# Patient Record
Sex: MALE | Race: OTHER | NOT HISPANIC OR LATINO | Employment: OTHER | URBAN - METROPOLITAN AREA
[De-identification: names, ages, dates, MRNs, and addresses within clinical notes are randomized per-mention and may not be internally consistent; named-entity substitution may affect disease eponyms.]

---

## 2024-06-19 ENCOUNTER — OFFICE VISIT (OUTPATIENT)
Dept: FAMILY MEDICINE CLINIC | Facility: CLINIC | Age: 39
End: 2024-06-19
Payer: MEDICAID

## 2024-06-19 VITALS
DIASTOLIC BLOOD PRESSURE: 74 MMHG | SYSTOLIC BLOOD PRESSURE: 116 MMHG | RESPIRATION RATE: 21 BRPM | WEIGHT: 219.2 LBS | OXYGEN SATURATION: 98 % | BODY MASS INDEX: 28.13 KG/M2 | TEMPERATURE: 98 F | HEART RATE: 75 BPM

## 2024-06-19 DIAGNOSIS — F90.9 HYPERACTIVE BEHAVIOR: ICD-10-CM
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
## 2024-06-19 DIAGNOSIS — R41.840 DIFFICULTY CONCENTRATING: ICD-10-CM

## 2024-06-19 PROCEDURE — 99213 OFFICE O/P EST LOW 20 MIN: CPT | Performed by: NURSE PRACTITIONER

## 2024-06-19 PROCEDURE — 1160F RVW MEDS BY RX/DR IN RCRD: CPT | Performed by: NURSE PRACTITIONER

## 2024-06-19 PROCEDURE — 1159F MED LIST DOCD IN RCRD: CPT | Performed by: NURSE PRACTITIONER

## 2024-06-19 NOTE — PROGRESS NOTES
Office Note     Name: Ann Mosley    : 1985     MRN: [id number]     Chief Complaint  Insect Bite and Groin Pain    Subjective     History of Present Illness:  Ann Mosley is a 38 y.o. male who presents today for insect bites. He reports being outside for a family gathering in a backyard. The bites are very itchy and he is using a topical spray for itching. He feels some tenderness in his right groin near just above the insect bites.  Positive seasonal allergies. He does not take any medications.     Additionally, the patient reports having a long history of ADHD symptoms. He has difficulty concentrating. He reports having hyperactivity symptoms. His states his wife has noticed his symptoms and requests he have an evaluation for ADHD.     Review of Systems:   Review of Systems   Constitutional:  Negative for chills, diaphoresis and fever.   Gastrointestinal:  Negative for diarrhea, nausea and vomiting.   Musculoskeletal:  Negative for myalgias.   Neurological:  Negative for dizziness, light-headedness and headache.       Past Medical History: History reviewed. No pertinent past medical history.    Past Surgical History: History reviewed. No pertinent surgical history.    Immunizations:   Immunization History   Administered Date(s) Administered    COVID-19 (PFIZER) Purple Cap Monovalent 2021, 2021    Tdap 06/10/2017        Medications:     Current Outpatient Medications:     cyclobenzaprine (FLEXERIL) 10 MG tablet, 1/2-1 PO Qhs prn muscle spasm (Patient not taking: Reported on 2024), Disp: 30 tablet, Rfl: 0    diphenhydrAMINE (BENADRYL) 2 % cream, Apply 1 Application topically to the appropriate area as directed 3 (Three) Times a Day As Needed for Itching., Disp: 15 g, Rfl: 0    naproxen (NAPROSYN) 500 MG tablet, Take 1 tablet by mouth 2 (Two) Times a Day As Needed for Mild Pain for up to 14 doses. (Patient not taking: Reported on 2024), Disp: 14 tablet, Rfl:  "0    Allergies:   No Known Allergies    Family History: History reviewed. No pertinent family history.    Social History:   Social History     Socioeconomic History    Marital status:    Tobacco Use    Smoking status: Former     Current packs/day: 0.00     Average packs/day: 1 pack/day for 20.0 years (20.0 ttl pk-yrs)     Types: Cigarettes     Start date: 2003     Quit date: 2023     Years since quittin.7    Smokeless tobacco: Never   Vaping Use    Vaping status: Every Day    Substances: Nicotine, Flavoring    Devices: Disposable    Passive vaping exposure: Yes   Substance and Sexual Activity    Alcohol use: Never    Drug use: Never    Sexual activity: Defer         Objective     Vital Signs  /74 (BP Location: Left arm, Patient Position: Sitting, Cuff Size: Adult)   Pulse 75   Temp 98 °F (36.7 °C) (Infrared)   Resp 21   Wt 99.4 kg (219 lb 3.2 oz)   SpO2 98%   BMI 28.13 kg/m²   Estimated body mass index is 28.13 kg/m² as calculated from the following:    Height as of 23: 188 cm (74.02\").    Weight as of this encounter: 99.4 kg (219 lb 3.2 oz).            Physical Exam  Nursing note reviewed.   Constitutional:       General: He is not in acute distress.     Appearance: Normal appearance. He is not ill-appearing or toxic-appearing.   HENT:      Head: Normocephalic and atraumatic.      Mouth/Throat:      Mouth: Mucous membranes are moist.      Pharynx: Oropharynx is clear.   Cardiovascular:      Rate and Rhythm: Normal rate and regular rhythm.      Heart sounds: Normal heart sounds.   Pulmonary:      Effort: Pulmonary effort is normal.      Breath sounds: Normal breath sounds.   Skin:     General: Skin is warm and dry.      Comments: The patient has two erythematous, mildly edematous lesions on his right anterior lower abdomen and one erythematous lesion on his right lower anterior abdomen. 3/4 cm by 1 1/4 cm in size.  Additionally, the patient has five erythematous lesions on his " right posterior thigh. Lesions are all similar in size. The lesions have a small central area that appears to be a bite. No drainage or bleeding. Patient scratching lesions occasionally during the clinic visit.    Neurological:      General: No focal deficit present.      Mental Status: He is alert.   Psychiatric:         Mood and Affect: Mood normal.         Behavior: Behavior normal.         Thought Content: Thought content normal.         Judgment: Judgment normal.          Assessment and Plan     Procedures      Lab Results (last 72 hours)       ** No results found for the last 72 hours. **                  Diagnoses and all orders for this visit:    1. Insect bite, unspecified site, initial encounter (Primary)  -     diphenhydrAMINE (BENADRYL) 2 % cream; Apply 1 Application topically to the appropriate area as directed 3 (Three) Times a Day As Needed for Itching.  Dispense: 15 g; Refill: 0    2. Difficulty concentrating  -     Ambulatory Referral to Psychiatry    3. Hyperactive behavior  -     Ambulatory Referral to Psychiatry    Reviewed exam findings with the patient. Bites are localized with no signs of infection. Avoid scratching. Apply medication as directed. Refer to Psychiatry for consult regarding ADHD symptoms.         Follow Up  Return if symptoms worsen or fail to improve.    FRANCINE Sarmiento [facility]  [address] [place] [id number] [phone]

## 2024-06-20 ENCOUNTER — TELEPHONE (OUTPATIENT)
Dept: FAMILY MEDICINE CLINIC | Facility: CLINIC | Age: 39
End: 2024-06-20

## 2024-06-20 NOTE — TELEPHONE ENCOUNTER
Pharmacy Name: [facility] [place] [address] [facility] [phone] [facility] [phone] FX     Reference Number (if applicable):     Pharmacy representative name: NADER    Pharmacy representative phone number: [phone]    What medication are you calling in regards to: BENEDRYL CREAM    What question does the pharmacy have: THEY CANNOT FIND THE BENEDRYL CREAM ONLY AND WANTED TO CHANGE TO A GEL. PLEASE CALL AND LET THEM KNOW IF THIS IS OK    Who is the provider that prescribed the medication: JENARO    Additional notes:
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
## 2024-06-24 RX ORDER — CAMPHOR 0.45 %
1 GEL (GRAM) TOPICAL 4 TIMES DAILY PRN
Qty: 57 G | Refills: 0 | Status: SHIPPED | OUTPATIENT
Start: 2024-06-24

## 2024-08-09 ENCOUNTER — TELEPHONE (OUTPATIENT)
Dept: PSYCHIATRY | Facility: CLINIC | Age: 39
End: 2024-08-09
Payer: MEDICAID

## 2024-08-09 NOTE — TELEPHONE ENCOUNTER
Left pt a vm for the pt to call the office back to reschedule his initial appt with provider.  [place] was out of [place] when he [place] onto the appt and provider made pt aware that he has to be in the state of [place] when doing th visit. Pt will need to make sure he makes the visit on day when the pt is going to be in [place].

## 2024-08-26 ENCOUNTER — TELEPHONE (OUTPATIENT)
Dept: PSYCHIATRY | Facility: CLINIC | Age: 39
End: 2024-08-26
Payer: MEDICAID

## 2024-08-26 NOTE — TELEPHONE ENCOUNTER
Patient called office asking to book appointment with Conor Mann. After offering the patient the next available appointment, patient stated he would rather see someone in person. Patient was made aware referral will be sent back to referring provider.   A message was sent to Treva  requesting referral to be sent back.

## 2024-10-31 ENCOUNTER — PRIOR AUTHORIZATION (OUTPATIENT)
Dept: BEHAVIORAL HEALTH | Facility: CLINIC | Age: 39
End: 2024-10-31
Payer: MEDICAID

## 2024-10-31 ENCOUNTER — OFFICE VISIT (OUTPATIENT)
Age: 39
End: 2024-10-31
Payer: MEDICAID

## 2024-10-31 VITALS
DIASTOLIC BLOOD PRESSURE: 78 MMHG | SYSTOLIC BLOOD PRESSURE: 122 MMHG | BODY MASS INDEX: 28.23 KG/M2 | HEIGHT: 74 IN | OXYGEN SATURATION: 98 % | HEART RATE: 79 BPM | WEIGHT: 220 LBS

## 2024-10-31 DIAGNOSIS — F90.0 ADHD (ATTENTION DEFICIT HYPERACTIVITY DISORDER), INATTENTIVE TYPE: Chronic | ICD-10-CM

## 2024-10-31 DIAGNOSIS — Z51.81 ENCOUNTER FOR THERAPEUTIC DRUG MONITORING: ICD-10-CM

## 2024-10-31 DIAGNOSIS — F90.0 ADHD (ATTENTION DEFICIT HYPERACTIVITY DISORDER), INATTENTIVE TYPE: Primary | Chronic | ICD-10-CM

## 2024-10-31 RX ORDER — METHYLPHENIDATE HYDROCHLORIDE 18 MG/1
18 TABLET ORAL DAILY
Qty: 30 TABLET | Refills: 0 | Status: SHIPPED | OUTPATIENT
Start: 2024-10-31 | End: 2024-10-31 | Stop reason: SDUPTHER

## 2024-10-31 RX ORDER — METHYLPHENIDATE HYDROCHLORIDE 18 MG/1
18 TABLET, EXTENDED RELEASE ORAL DAILY
Qty: 30 TABLET | Refills: 0 | Status: SHIPPED | OUTPATIENT
Start: 2024-10-31 | End: 2025-10-31

## 2024-10-31 NOTE — TELEPHONE ENCOUNTER
Concerta 18MG er tablets    Form  [employer] [place] Medicaid ePA Form 2017 NCPDP    Key: [id number]    Sent to plan

## 2024-10-31 NOTE — TELEPHONE ENCOUNTER
The brand name, Concerta, is preferred for the member, but requires prior authorization.    If the brand is acceptable, please resubmit the ePA under the brand name and continue through the criteria.If there is a clinical reason the patient cannot use the brand formulation (such as an allergy to an ingredient found in the brand that is not in the generic) then please fax the appropriate PA request along with the confirming documentation directly to [employer] at [phone].
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
## 2024-10-31 NOTE — TELEPHONE ENCOUNTER
Methylphenidate HCl ER TBCR 18MG tablets    Form  [employer] [place] Medicaid ePA Form 2017 NCPD    Key: [id number]    PA started

## 2024-10-31 NOTE — TELEPHONE ENCOUNTER
The request has been approved. The authorization is effective from 10/31/2024 to 10/31/2025, as long as the member is enrolled in their current health plan

## 2024-10-31 NOTE — TELEPHONE ENCOUNTER
Called Pt DESI LOZANO advising Pt the PA has been approved for Brand name concerta and Rx was sent to [address].

## 2024-11-05 LAB
1OH-MIDAZOLAM UR QL SCN: NOT DETECTED NG/MG CREAT
6MAM UR QL SCN: NEGATIVE NG/ML
7AMINOCLONAZEPAM/CREAT UR: NOT DETECTED NG/MG CREAT
A-OH ALPRAZ/CREAT UR: NOT DETECTED NG/MG CREAT
A-OH-TRIAZOLAM/CREAT UR CFM: NOT DETECTED NG/MG CREAT
ACP UR QL CFM: NOT DETECTED
ALPRAZ/CREAT UR CFM: NOT DETECTED NG/MG CREAT
AMPHETAMINES UR QL SCN: NEGATIVE NG/ML
APAP UR QL SCN: NEGATIVE UG/ML
BARBITURATES UR QL SCN: NEGATIVE NG/ML
BENZODIAZ SCN METH UR: NEGATIVE
BUPRENORPHINE UR QL SCN: NEGATIVE
BUPRENORPHINE/CREAT UR: NOT DETECTED NG/MG CREAT
CANNABINOIDS UR QL CFM: NORMAL
CANNABINOIDS UR QL SCN: NORMAL NG/ML
CARBOXYTHC UR CFM-MCNC: 23 NG/MG CREAT
CARISOPRODOL UR QL: NEGATIVE NG/ML
CLONAZEPAM/CREAT UR CFM: NOT DETECTED NG/MG CREAT
COCAINE+BZE UR QL SCN: NEGATIVE NG/ML
CREAT UR-MCNC: 207 MG/DL
D-METHORPHAN UR-MCNC: NOT DETECTED NG/ML
D-METHORPHAN+LEVORPHANOL UR QL: PRESENT
DESALKYLFLURAZ/CREAT UR: NOT DETECTED NG/MG CREAT
DIAZEPAM/CREAT UR: NOT DETECTED NG/MG CREAT
ETHANOL UR QL SCN: NEGATIVE G/DL
ETHANOL UR QL SCN: NEGATIVE NG/ML
FENTANYL CTO UR SCN-MCNC: NEGATIVE NG/ML
FENTANYL/CREAT UR: NOT DETECTED NG/MG CREAT
FLUNITRAZEPAM UR QL SCN: NOT DETECTED NG/MG CREAT
GABAPENTIN UR-MCNC: NEGATIVE UG/ML
HALLUCINOGENS UR: NEGATIVE
HYPNOTICS UR QL SCN: NEGATIVE
KETAMINE UR QL: NOT DETECTED
LORAZEPAM/CREAT UR: NOT DETECTED NG/MG CREAT
MEPERIDINE UR QL SCN: NEGATIVE NG/ML
METHADONE UR QL SCN: NEGATIVE NG/ML
METHADONE+METAB UR QL SCN: NEGATIVE NG/ML
MIDAZOLAM/CREAT UR CFM: NOT DETECTED NG/MG CREAT
MISCELLANEOUS, UR: NORMAL
NORBUPRENORPHINE/CREAT UR: NOT DETECTED NG/MG CREAT
NORDIAZEPAM/CREAT UR: NOT DETECTED NG/MG CREAT
NORFENTANYL/CREAT UR: NOT DETECTED NG/MG CREAT
NORFLUNITRAZEPAM UR-MCNC: NOT DETECTED NG/MG CREAT
NORKETAMINE UR-MCNC: NOT DETECTED UG/ML
OPIATES UR SCN-MCNC: NEGATIVE NG/ML
OXAZEPAM/CREAT UR: NOT DETECTED NG/MG CREAT
OXYCODONE CTO UR SCN-MCNC: NEGATIVE NG/ML
PCP UR QL SCN: NEGATIVE NG/ML
PRESCRIBED MEDICATIONS: NORMAL
PROPOXYPH UR QL SCN: NEGATIVE NG/ML
TAPENTADOL CTO UR SCN-MCNC: NEGATIVE NG/ML
TEMAZEPAM/CREAT UR: NOT DETECTED NG/MG CREAT
TRAMADOL UR QL SCN: NEGATIVE NG/ML
ZALEPLON UR-MCNC: NOT DETECTED NG/ML
ZOLPIDEM PHENYL-4-CARB UR QL SCN: NOT DETECTED
ZOLPIDEM UR QL SCN: NOT DETECTED
ZOPICLONE-N-OXIDE UR-MCNC: NOT DETECTED NG/ML

## 2024-12-02 ENCOUNTER — OFFICE VISIT (OUTPATIENT)
Age: 39
End: 2024-12-02
Payer: MEDICAID

## 2024-12-02 VITALS
WEIGHT: 218.8 LBS | HEART RATE: 82 BPM | SYSTOLIC BLOOD PRESSURE: 116 MMHG | HEIGHT: 74 IN | DIASTOLIC BLOOD PRESSURE: 74 MMHG | BODY MASS INDEX: 28.08 KG/M2 | OXYGEN SATURATION: 97 %

## 2024-12-02 DIAGNOSIS — F90.0 ADHD (ATTENTION DEFICIT HYPERACTIVITY DISORDER), INATTENTIVE TYPE: Primary | Chronic | ICD-10-CM

## 2024-12-02 PROCEDURE — 1159F MED LIST DOCD IN RCRD: CPT | Performed by: STUDENT IN AN ORGANIZED HEALTH CARE EDUCATION/TRAINING PROGRAM

## 2024-12-02 PROCEDURE — 99214 OFFICE O/P EST MOD 30 MIN: CPT | Performed by: STUDENT IN AN ORGANIZED HEALTH CARE EDUCATION/TRAINING PROGRAM

## 2024-12-02 PROCEDURE — 1160F RVW MEDS BY RX/DR IN RCRD: CPT | Performed by: STUDENT IN AN ORGANIZED HEALTH CARE EDUCATION/TRAINING PROGRAM

## 2024-12-02 PROCEDURE — 96127 BRIEF EMOTIONAL/BEHAV ASSMT: CPT | Performed by: STUDENT IN AN ORGANIZED HEALTH CARE EDUCATION/TRAINING PROGRAM

## 2024-12-02 RX ORDER — METHYLPHENIDATE HYDROCHLORIDE 18 MG/1
18 TABLET, EXTENDED RELEASE ORAL DAILY
Qty: 30 TABLET | Refills: 0 | Status: SHIPPED | OUTPATIENT
Start: 2024-12-02 | End: 2025-12-02

## 2024-12-02 NOTE — PROGRESS NOTES
[facility] Zev Cummings             Follow Up Office Visit      Patient Name: Ann Mosley  : 1985   MRN: [id number]     Referring Provider: Provider, No Known    Chief Complaint:      ICD-10-CM ICD-9-CM   1. ADHD (attention deficit hyperactivity disorder), inattentive type  F90.0 314.00      History of Present Illness:   Ann Mosley is a 39 y.o. male   History of Present Illness    The patient presents for an intake appointment.  Patient is seen and evaluated in the office.  He appears to be in no acute distress at this time.  He is calm and cooperative with evaluation, and exhibits a linear and goal-directed thought process. He reports that his medication has been effective in improving his focus, speech, and thought processes. Initially, he experienced issues with insomnia and irritability, but these have resolved. He has noticed a decrease in his appetite, but will monitor for this. We discussed adding protein shakes to maintain energy levels if needed. He is interested in therapy. We discussed virtual option, which would work best for him. Referral through [facility] will be deferred at this time as patient is often out of state for work and he needs therapy services that could see him when he is not in [place]. He will reach out to writer if referral through [facility] is desired in the future. For now, we will keep medication the same as he has seen substantial benefit and side effects have dissipated. He denies SI, intent, or plan. We will follow up in 3 mo.      Previous Medication Trials:  Wellbutrin (insomnia)     Subjective      Review of Systems:   Review of Systems   Constitutional:  Negative for chills, fatigue and fever.   HENT:  Negative for congestion, hearing loss, sore throat and trouble swallowing.    Eyes:  Negative for blurred vision and double vision.   Respiratory:  Negative for cough, chest tightness and shortness of breath.   "  Cardiovascular:  Negative for chest pain and palpitations.   Gastrointestinal:  Negative for abdominal pain, constipation, diarrhea, nausea and vomiting.   Endocrine: Negative for polydipsia and polyuria.   Genitourinary:  Negative for hematuria and urgency.   Musculoskeletal:  Negative for arthralgias.   Skin:  Negative for skin lesions and bruise.   Neurological:  Negative for dizziness, tremors, seizures and light-headedness.   Hematological:  Negative for adenopathy.     Screening Scores:   PHQ-9 : 4  VICTOR MANUEL-7 : 5    Medications:     Current Outpatient Medications:     Concerta 18 MG CR tablet, Take 1 tablet by mouth Daily, Disp: 30 tablet, Rfl: 0    Medication Considerations:  RICKEY reviewed and appropriate.     Allergies:   No Known Allergies    Objective     Vital Signs:   Vitals:    12/02/24 1416   BP: 116/74   Pulse: 82   SpO2: 97%   Weight: 99.2 kg (218 lb 12.8 oz)   Height: 188 cm (74.02\")     Body mass index is 28.08 kg/m².     Mental Status Exam:   MENTAL STATUS EXAM   General Appearance:  Cleanly groomed and dressed  Eye Contact:  Good eye contact  Attitude:  Cooperative  Motor Activity:  Normal gait, posture  Muscle Strength:  Normal  Speech:  Normal rate, tone, volume  Language:  Spontaneous  Mood and affect:  Normal, pleasant and appropriate  Hopelessness:  Denies  Thought Process:  Logical and goal-directed  Associations/ Thought Content:  No delusions  Hallucinations:  None  Suicidal Ideations:  Not present  Homicidal Ideation:  Not present  Sensorium:  Alert  Orientation:  Person, place, time and situation  Immediate Recall, Recent, and Remote Memory:  Intact  Attention Span/ Concentration:  Good  Fund of Knowledge:  Appropriate for age and educational level  Intellectual Functioning:  Average range  Insight:  Fair  Judgement:  Fair  Reliability:  Fair  Impulse Control:  Fair      SUICIDE RISK ASSESSMENT/CSSRS:  1. Does patient have thoughts of suicide?  denies  2. Does patient have intent for " suicide?  denies  3. Does patient have a current plan for suicide?  denies  4. History of suicide attempts:  denies  5. Family history of suicide or attempts:  denies  6. History of violent behaviors towards others or property or thoughts of committing suicide:  denies  7. History of sexual aggression toward others:  denies  8. Access to firearms or weapons:  denies    Assessment / Plan      Visit Diagnosis/Orders Placed This Visit:  Assessment & Plan  1. Attention Deficit Hyperactivity Disorder (ADHD).  The current dosage of Concerta will be maintained as it is providing good benefits. He was advised to take breaks from the medication during weekends or vacations if he feels it's unnecessary. He reported initial sleep disturbances and irritability which have improved over time. He has noticed some decrease in appetite, but will monitor this and add protein shakes if necessary. A referral for virtual therapy was offered, but he opted to defer it for now due to needing therapy services that would be able to see him while he is traveling in different [place]. His medication will be refilled today, and he was instructed to call the clinic for refills when he has about a week of medication left.    Follow-up  Return in March for follow up.    1. ADHD (attention deficit hyperactivity disorder), inattentive type (Primary)  - CPT completed and reviewed with patient last visit: high likelihood of adhd  - Continue Concerta 18 mg po daily  - CSA signed 10/31/24  - Follow up with writer in 3 mo  Labs Reviewed : labs from 12/20/23 reviewed  Chart Reviewed      Functional Status: Mild impairment      Prognosis: Fair with Ongoing Treatment     Impression/Formulation:  Patient appeared alert and oriented. Patient is receptive to assistance with maintaining a stable lifestyle.  Patient presents with history of     ICD-10-CM ICD-9-CM   1. ADHD (attention deficit hyperactivity disorder), inattentive type  F90.0 314.00     Treatment  Plan:     Patient will continue supportive psychotherapy efforts and medications as indicated. Clinic will obtain release of information for current treatment team for continuity of care as needed. Patient will contact this office, call 911 or present to the nearest emergency room should suicidal or homicidal ideations occur.  Discussed medication options and treatment plan of prescribed medication(s) as well as the risks, benefits, and potential side effects. Patient ackowledged and verbally consented to continue with current treatment plan and was educated on the importance of compliance with treatment and follow-up appointments.     Quality Measures:  Tobacco: Ann Mosley  reports that he quit smoking about 14 months ago. His smoking use included cigarettes. He started smoking about 21 years ago. He has a 20 pack-year smoking history. He has never used smokeless tobacco. I have educated him on the risk of diseases from using tobacco products such as cancer, COPD, and heart disease.     Follow Up:   Return in about 3 months (around 3/2/2025) for Medication Management.    Short-term goals: Patient will adhere to medication regimen and experience continued improvement in symptoms over the next 3 months.   Long-term goals: Patient will adhere to medication treatment plan and report improvement in symptoms over the next 6 months    Yasmin Cedeno MD  [facility] [address]     This is electronically signed by Yasmin Cedeno MD     Patient or patient representative verbalized consent for the use of Ambient Listening during the visit with  Yasmin Cedeno MD for chart documentation. 12/2/2024  14:07 EST

## 2024-12-03 ENCOUNTER — OFFICE VISIT (OUTPATIENT)
Dept: FAMILY MEDICINE CLINIC | Facility: CLINIC | Age: 39
End: 2024-12-03
Payer: MEDICAID

## 2024-12-03 VITALS
DIASTOLIC BLOOD PRESSURE: 68 MMHG | OXYGEN SATURATION: 100 % | HEART RATE: 73 BPM | SYSTOLIC BLOOD PRESSURE: 116 MMHG | HEIGHT: 74 IN | TEMPERATURE: 98.4 F | WEIGHT: 222.6 LBS | BODY MASS INDEX: 28.57 KG/M2

## 2024-12-03 DIAGNOSIS — Z13.29 SCREENING FOR THYROID DISORDER: ICD-10-CM

## 2024-12-03 DIAGNOSIS — Z00.00 ANNUAL PHYSICAL EXAM: Primary | ICD-10-CM

## 2024-12-03 DIAGNOSIS — R21 RASH: ICD-10-CM

## 2024-12-03 DIAGNOSIS — E55.9 VITAMIN D DEFICIENCY: ICD-10-CM

## 2024-12-03 DIAGNOSIS — R68.82 DECREASED LIBIDO: ICD-10-CM

## 2024-12-03 DIAGNOSIS — Z83.3 FAMILY HISTORY OF DIABETES MELLITUS: ICD-10-CM

## 2024-12-03 DIAGNOSIS — H66.92 LEFT OTITIS MEDIA, UNSPECIFIED OTITIS MEDIA TYPE: ICD-10-CM

## 2024-12-03 DIAGNOSIS — Z76.89 ENCOUNTER TO ESTABLISH CARE WITH NEW DOCTOR: ICD-10-CM

## 2024-12-03 DIAGNOSIS — E78.5 DYSLIPIDEMIA: ICD-10-CM

## 2024-12-03 DIAGNOSIS — R53.83 FATIGUE, UNSPECIFIED TYPE: ICD-10-CM

## 2024-12-03 PROCEDURE — 1159F MED LIST DOCD IN RCRD: CPT | Performed by: FAMILY MEDICINE

## 2024-12-03 PROCEDURE — 1160F RVW MEDS BY RX/DR IN RCRD: CPT | Performed by: FAMILY MEDICINE

## 2024-12-03 PROCEDURE — 99396 PREV VISIT EST AGE 40-64: CPT | Performed by: FAMILY MEDICINE

## 2024-12-03 PROCEDURE — 1125F AMNT PAIN NOTED PAIN PRSNT: CPT | Performed by: FAMILY MEDICINE

## 2024-12-03 RX ORDER — AMOXICILLIN 875 MG/1
875 TABLET, COATED ORAL 2 TIMES DAILY
Qty: 20 TABLET | Refills: 0 | Status: SHIPPED | OUTPATIENT
Start: 2024-12-03 | End: 2024-12-13

## 2024-12-03 NOTE — PROGRESS NOTES
Male Physical Note      Date: 2024   Patient Name: Ann Mosley  : 1985   MRN: [id number]     Chief Complaint:    Chief Complaint   Patient presents with    [facility]     Wants a physical,blood work, check vitamin D, kidney/liver function and cholesterol.         History of Present Illness: Ann Mosley is a 39 y.o. male who is here today for their annual health maintenance and physical.     Takes Concerta for ADHD.  Prescribed by Psychiatry.  Takes when he needs it.    .    Reports chol prev tested high.    Thinks may have an ear infection  Reports he has  been having left ear pain.    Patient reports he has been tired for some time.  Also reports decreased libido.  Would like testosterone level checked.    Subjective      Review of Systems:   Review of Systems   Constitutional:  Negative for chills and fever.   HENT:  Positive for ear pain (left) and sinus pressure.    Respiratory:  Positive for cough.    Gastrointestinal:  Negative for nausea and vomiting.       Past Medical History, Social History, Family History and Care Team were all reviewed with patient and updated as appropriate.     Medications:     Current Outpatient Medications:     Concerta 18 MG CR tablet, Take 1 tablet by mouth Daily, Disp: 30 tablet, Rfl: 0    amoxicillin (AMOXIL) 875 MG tablet, Take 1 tablet by mouth 2 (Two) Times a Day for 10 days., Disp: 20 tablet, Rfl: 0    Allergies:   No Known Allergies    Immunizations:  Health Maintenance Summary            Overdue - ANNUAL PHYSICAL (Yearly) Never done      No completion, postpone, or frequency change history exists for this topic.              Overdue - COVID-19 Vaccine (3 - 2024-25 season) Overdue since 2024  Postponed until 2024 by Néstor Schneider MA (Product Unavailable)    2023  Postponed until 3/11/2024 by Henea Martinez MA (Product Unavailable)    2021  Imm Admin: COVID-19 (PFIZER)  "Purple Cap Monovalent    03/06/2021  Imm Admin: COVID-19 (PFIZER) Purple Cap Monovalent              Postponed - HEPATITIS C SCREENING (Once) Postponed until 12/21/2024 12/21/2023  Postponed until 12/21/2024 by Heena Martinez MA (Patient Refused)              Postponed - INFLUENZA VACCINE (Yearly - July to March) Postponed until 3/31/2025      12/03/2024  Postponed until 3/31/2025 by Geoffrey Harris MA (Patient Refused)    12/21/2023  Postponed until 3/31/2024 by Heena Martinez MA (Patient Refused)              BMI FOLLOWUP (Yearly) Next due on 12/21/2024 12/21/2023  [employer]: BMI EDUCATION FOR OVERWEIGHT              LIPID PANEL (Yearly) Next due on 12/4/2025 12/04/2024  Lipid Panel    06/17/2022  LIPID PANEL W/ CHOL/HDL RATIO    02/23/2022  LIPID PANEL W/ CHOL/HDL RATIO              TDAP/TD VACCINES (2 - Td or Tdap) Next due on 6/10/2027      06/10/2017  Imm Admin: Tdap              Pneumococcal Vaccine 0-64 (Series Information) Aged Out      No completion, postpone, or frequency change history exists for this topic.                    No orders of the defined types were placed in this encounter.      Colorectal Screening:   na  Last Completed Colonoscopy       This patient has no relevant Health Maintenance data.          CT for Smoker (Age 50-80, 20pk yr within last 15 years):  na  Bone Density/DEXA (high risk): na  Hep C (Age 18-79 once):  prev  HIV (Age 15-65 once) : prev  PSA (Over age 50, C Level Recommendation):  na  US Aorta (For male smokers, age 65):  na  A1c:   Hemoglobin A1C   Date Value Ref Range Status   12/04/2024 5.10 4.80 - 5.60 % Final   02/23/2022 4.9 <5.7 % Final     Lipid panel: No results found for: \"LABLIPI\"    The 10-year ASCVD risk score (Osman MEDINA, et al., 2019) is: 1.9%    Values used to calculate the score:      Age: 39 years      Sex: Male      Is Non- : No      Diabetic: No      Tobacco smoker: No      Systolic Blood Pressure: 116 mmHg      " "Is BP treated: No      HDL Cholesterol: 44 mg/dL      Total Cholesterol: 245 mg/dL    Dermatology: requests referral..  skin lesion on back.  Ophthalmologist: follows  Dentist: waiting for scheduling.    Tobacco Use: Medium Risk (12/3/2024)    Patient History     Smoking Tobacco Use: Former     Smokeless Tobacco Use: Never     Passive Exposure: Not on file       Social History     Substance and Sexual Activity   Alcohol Use Never        Social History     Substance and Sexual Activity   Drug Use Never        Diet/Physical activity: balanced diet/exercises.    Sexual health: uses contraception used    PHQ-9 Depression Screening  PHQ-9 Total Score:      Measures:   Advanced Care Planning:   Patient does not have an advance directive, declines further assistance.    Smoking Cessation:   Does not smoke     Objective     Physical Exam:  Vital Signs:   Vitals:    12/03/24 1444   BP: 116/68   Pulse: 73   Temp: 98.4 °F (36.9 °C)   TempSrc: Temporal   SpO2: 100%   Weight: 101 kg (222 lb 9.6 oz)   Height: 188 cm (74.02\")   PainSc:   2   PainLoc: Shoulder  Comment: right     Body mass index is 28.57 kg/m².        BMI is >= 25 and <30. (Overweight) The following options were offered after discussion;: exercise counseling/recommendations and nutrition counseling/recommendations            Physical Exam  Vitals and nursing note reviewed.   Constitutional:       Appearance: Normal appearance.   HENT:      Head: Normocephalic and atraumatic.      Ears:      Comments: Left TM with erythema.  Neck:      Vascular: No carotid bruit.   Cardiovascular:      Rate and Rhythm: Normal rate and regular rhythm.      Heart sounds: Normal heart sounds. No murmur heard.  Pulmonary:      Effort: Pulmonary effort is normal.      Breath sounds: Normal breath sounds.   Abdominal:      General: Bowel sounds are normal.      Palpations: Abdomen is soft. There is no mass.      Tenderness: There is no abdominal tenderness.   Musculoskeletal:      Right " lower leg: No edema.      Left lower leg: No edema.   Skin:     Coloration: Skin is not jaundiced or pale.      Findings: No erythema.      Comments: Raised pigmented rash back and mid abdomen   Neurological:      Mental Status: He is alert. Mental status is at baseline.   Psychiatric:         Mood and Affect: Mood normal.         Behavior: Behavior normal.          POCT Results (if applicable):   Results for orders placed or performed in visit on 10/31/24   ToxAssure Flex 23, Ur -    Collection Time: 10/31/24  9:16 AM   Result Value Ref Range    Report Summary FINAL     CREATININE 207 mg/dL    Amphetamines, IA Negative CUTOFF:300 ng/mL    Benzodiazepines Negative     Diazepam Urine, Qualitative Not Detected ng/mg creat    Desmethyldiazepam Not Detected ng/mg creat    Oxazepam, urine Not Detected ng/mg creat    Temazepam Not Detected ng/mg creat    Alprazolam Urine, Conf Not Detected ng/mg creat    Alpha-hydroxyalprazolam, Urine Not Detected ng/mg creat    Desalkylflurazepam, Urine Not Detected ng/mg creat    Lorazepam, Urine Not Detected ng/mg creat    Alpha-hydroxytriazolam, Urine Not Detected ng/mg creat    Clonazepam Not Detected ng/mg creat    7- AMINOCLONAZEPAM Not Detected ng/mg creat    Midazolam, Urine Not Detected ng/mg creat    Alpha-hydroxymidazolam, Urine Not Detected ng/mg creat    Flunitrazepam Not Detected ng/mg creat    DESMETHYLFLUNITRAZEPAM Not Detected ng/mg creat    COCAINE / METABOLITE, IA Negative CUTOFF:150 ng/mL    Ethyl Alcohol, Enz Negative CUTOFF:0.020 g/dL    Ethanol and Ethanol Biomarkers Negative CUTOFF:500 ng/mL    Cannavinoids IA Comment CUTOFF:20 ng/mL    6-Acetylmorphine IA Negative CUTOFF:10 ng/mL    Opiate Class IA Negative CUTOFF:100 ng/mL    Oxycodone Class IA Negative CUTOFF:100 ng/mL    METHADONE, IA Negative CUTOFF:100 ng/mL    Methadone MTB IA Negative CUTOFF:100 ng/mL    Buprenorphine, Urine Negative     Buprenorphine, Urine Not Detected ng/mg creat    Norbuprenorphine Not  Detected ng/mg creat    Fentanyl Negative     Fentanyl, Urine Not Detected ng/mg creat    Norfentanyl Urine Not Detected ng/mg creat    Tapentadol, IA Negative CUTOFF:200 ng/mL    MEPERIDINE, IA Negative CUTOFF:200 ng/mL    PROPOXYPHENE, IA Negative CUTOFF:300 ng/mL    TRAMADOL, IA Negative CUTOFF:200 ng/mL    Barbiturates, IA Negative CUTOFF:200 ng/mL    Other Hallucinogens Ur Negative     Ketamine Not Detected     Norketamine Not Detected     PHENCYCLIDINE, IA Negative CUTOFF:25 ng/mL    Gabapentin, IA Negative CUTOFF:1.0 ug/mL    Carisoprodol, IA Negative CUTOFF:100 ng/mL    SEDATIVES/HYPNOTICS Negative     Zolpidem(Ambien) Urine Not Detected     Zolpidem Acid Not Detected     Eszopiclone/Zopiclone Not Detected     Amino Chloropyridine Not Detected     Zaleplon, Ur Not Detected     Acetaminophen, IA Negative CUTOFF:5.0 ug/mL    Miscellaneous, Ur +POSITIVE+     DEXTROMETHORPHAN Not Detected     Dextrorphan/Levorphanol PRESENT    Cannabinoids, MS, Ur RFX -    Collection Time: 10/31/24  9:16 AM   Result Value Ref Range    Cannabinoid Confirmation +POSITIVE+     Carboxy THC 23 ng/mg creat       Procedures    Assessment / Plan      Assessment/Plan:     1. Annual physical exam  Health maintenance topics discussed.  Handout printed for patient.  Discussed COVID vaccination.  Discussed hepatitis C screening-declined.  Discussed flu vaccine.  Patient declined      2. Left otitis media, unspecified otitis media type  Patient with erythematous left tympanic membrane.  Will prescribe amoxicillin course.  - amoxicillin (AMOXIL) 875 MG tablet; Take 1 tablet by mouth 2 (Two) Times a Day for 10 days.  Dispense: 20 tablet; Refill: 0    3. Fatigue, unspecified type  3-4 check testosterone level.  - Testosterone; Future    4. Decreased libido    - Testosterone; Future    5. Dyslipidemia  Check CMP.  Fasting lipid panel.  - Comprehensive Metabolic Panel; Future  - Lipid Panel; Future    6. Vitamin D deficiency  Check vitamin D  level.  - Vitamin D,25-Hydroxy; Future    7. Screening for thyroid disorder  Check TSH.  - TSH; Future    8. Rash  Referral to dermatology as requested.  - Ambulatory Referral to Dermatology    9. Family history of diabetes mellitus  Check A1c.  - Hemoglobin A1c; Future    10. Encounter to establish care with new doctor  Check CBC, CMP.  - Comprehensive Metabolic Panel; Future  - CBC & Differential; Future      Healthcare Maintenance:       Ann Gerardreemanicolasa Mosley voices understanding and acceptance of this advice and will call back with any further questions or concerns. AVS with preventive healthcare tips printed for patient.  Patient Counseling:  Nutrition: Stressed importance of moderation in sodium/caffeine intake, saturated fat and cholesterol, caloric balance, sufficient intake of fresh fruits, vegetables, fiber, calcium and iron.   Exercise: Stressed the importance of regular exercise.   Substance Abuse: Discussed cessation/primary prevention of tobacco, alcohol or other drug use. Driving or other dangerous activities under the influence, availability of treatment or abuse.   Sexuality: Discussed sexually transmitted diseases, partner selection, use of condoms, avoidance or unintended pregnancy and contraceptive alternatives.   Injury prevention: Discussed safety belts, safety helmets, smote detector, smoking near bedding or upholstery.   Dental health: Discussed importance of regular brushing, flossing and dental visits.   Immunizations: Reviewed and discussed.   Colonoscopy: Discussed screening and benefits.   After hours services discussed with patient.     Vaccine Counseling:      Follow Up:   Return in about 1 year (around 12/3/2025), or if symptoms worsen or fail to improve, for Annual.      DO LUDIN No

## 2024-12-03 NOTE — PATIENT INSTRUCTIONS

## 2024-12-04 ENCOUNTER — LAB (OUTPATIENT)
Dept: LAB | Facility: HOSPITAL | Age: 39
End: 2024-12-04
Payer: MEDICAID

## 2024-12-04 DIAGNOSIS — Z76.89 ENCOUNTER TO ESTABLISH CARE WITH NEW DOCTOR: ICD-10-CM

## 2024-12-04 DIAGNOSIS — R53.83 FATIGUE, UNSPECIFIED TYPE: ICD-10-CM

## 2024-12-04 DIAGNOSIS — Z83.3 FAMILY HISTORY OF DIABETES MELLITUS: ICD-10-CM

## 2024-12-04 DIAGNOSIS — E78.5 DYSLIPIDEMIA: ICD-10-CM

## 2024-12-04 DIAGNOSIS — Z13.29 SCREENING FOR THYROID DISORDER: ICD-10-CM

## 2024-12-04 DIAGNOSIS — R68.82 DECREASED LIBIDO: ICD-10-CM

## 2024-12-04 DIAGNOSIS — E55.9 VITAMIN D DEFICIENCY: ICD-10-CM

## 2024-12-04 LAB
BASOPHILS # BLD AUTO: 0.1 10*3/MM3 (ref 0–0.2)
BASOPHILS NFR BLD AUTO: 1 % (ref 0–1.5)
DEPRECATED RDW RBC AUTO: 38 FL (ref 37–54)
EOSINOPHIL # BLD AUTO: 0.28 10*3/MM3 (ref 0–0.4)
EOSINOPHIL NFR BLD AUTO: 2.9 % (ref 0.3–6.2)
ERYTHROCYTE [DISTWIDTH] IN BLOOD BY AUTOMATED COUNT: 11.8 % (ref 12.3–15.4)
HBA1C MFR BLD: 5.1 % (ref 4.8–5.6)
HCT VFR BLD AUTO: 47.1 % (ref 37.5–51)
HGB BLD-MCNC: 16.5 G/DL (ref 13–17.7)
IMM GRANULOCYTES # BLD AUTO: 0.05 10*3/MM3 (ref 0–0.05)
IMM GRANULOCYTES NFR BLD AUTO: 0.5 % (ref 0–0.5)
LYMPHOCYTES # BLD AUTO: 2.94 10*3/MM3 (ref 0.7–3.1)
LYMPHOCYTES NFR BLD AUTO: 30.7 % (ref 19.6–45.3)
MCH RBC QN AUTO: 31.3 PG (ref 26.6–33)
MCHC RBC AUTO-ENTMCNC: 35 G/DL (ref 31.5–35.7)
MCV RBC AUTO: 89.4 FL (ref 79–97)
MONOCYTES # BLD AUTO: 0.71 10*3/MM3 (ref 0.1–0.9)
MONOCYTES NFR BLD AUTO: 7.4 % (ref 5–12)
NEUTROPHILS NFR BLD AUTO: 5.51 10*3/MM3 (ref 1.7–7)
NEUTROPHILS NFR BLD AUTO: 57.5 % (ref 42.7–76)
NRBC BLD AUTO-RTO: 0 /100 WBC (ref 0–0.2)
PLATELET # BLD AUTO: 281 10*3/MM3 (ref 140–450)
PMV BLD AUTO: 9.9 FL (ref 6–12)
RBC # BLD AUTO: 5.27 10*6/MM3 (ref 4.14–5.8)
WBC NRBC COR # BLD AUTO: 9.59 10*3/MM3 (ref 3.4–10.8)

## 2024-12-04 PROCEDURE — 80061 LIPID PANEL: CPT

## 2024-12-04 PROCEDURE — 85025 COMPLETE CBC W/AUTO DIFF WBC: CPT

## 2024-12-04 PROCEDURE — 84443 ASSAY THYROID STIM HORMONE: CPT

## 2024-12-04 PROCEDURE — 82306 VITAMIN D 25 HYDROXY: CPT

## 2024-12-04 PROCEDURE — 83036 HEMOGLOBIN GLYCOSYLATED A1C: CPT

## 2024-12-04 PROCEDURE — 84403 ASSAY OF TOTAL TESTOSTERONE: CPT

## 2024-12-04 PROCEDURE — 80053 COMPREHEN METABOLIC PANEL: CPT

## 2024-12-04 PROCEDURE — 36415 COLL VENOUS BLD VENIPUNCTURE: CPT

## 2024-12-05 LAB
25(OH)D3 SERPL-MCNC: 20.4 NG/ML (ref 30–100)
ALBUMIN SERPL-MCNC: 4.5 G/DL (ref 3.5–5.2)
ALBUMIN/GLOB SERPL: 1.7 G/DL
ALP SERPL-CCNC: 89 U/L (ref 39–117)
ALT SERPL W P-5'-P-CCNC: 21 U/L (ref 1–41)
ANION GAP SERPL CALCULATED.3IONS-SCNC: 13.2 MMOL/L (ref 5–15)
AST SERPL-CCNC: 19 U/L (ref 1–40)
BILIRUB SERPL-MCNC: 0.9 MG/DL (ref 0–1.2)
BUN SERPL-MCNC: 11 MG/DL (ref 6–20)
BUN/CREAT SERPL: 10.8 (ref 7–25)
CALCIUM SPEC-SCNC: 9.7 MG/DL (ref 8.6–10.5)
CHLORIDE SERPL-SCNC: 102 MMOL/L (ref 98–107)
CHOLEST SERPL-MCNC: 245 MG/DL (ref 0–200)
CO2 SERPL-SCNC: 25.8 MMOL/L (ref 22–29)
CREAT SERPL-MCNC: 1.02 MG/DL (ref 0.76–1.27)
EGFRCR SERPLBLD CKD-EPI 2021: 94.7 ML/MIN/1.73
GLOBULIN UR ELPH-MCNC: 2.6 GM/DL
GLUCOSE SERPL-MCNC: 84 MG/DL (ref 65–99)
HDLC SERPL-MCNC: 44 MG/DL (ref 40–60)
LDLC SERPL CALC-MCNC: 163 MG/DL (ref 0–100)
LDLC/HDLC SERPL: 3.65 {RATIO}
POTASSIUM SERPL-SCNC: 4.5 MMOL/L (ref 3.5–5.2)
PROT SERPL-MCNC: 7.1 G/DL (ref 6–8.5)
SODIUM SERPL-SCNC: 141 MMOL/L (ref 136–145)
TESTOST SERPL-MCNC: 349 NG/DL (ref 249–836)
TRIGL SERPL-MCNC: 203 MG/DL (ref 0–150)
TSH SERPL DL<=0.05 MIU/L-ACNC: 1.36 UIU/ML (ref 0.27–4.2)
VLDLC SERPL-MCNC: 38 MG/DL (ref 5–40)

## 2025-02-11 ENCOUNTER — TELEPHONE (OUTPATIENT)
Age: 40
End: 2025-02-11
Payer: MEDICAID

## 2025-02-11 DIAGNOSIS — F90.0 ADHD (ATTENTION DEFICIT HYPERACTIVITY DISORDER), INATTENTIVE TYPE: Primary | ICD-10-CM

## 2025-05-05 ENCOUNTER — TELEMEDICINE (OUTPATIENT)
Dept: PSYCHIATRY | Facility: CLINIC | Age: 40
End: 2025-05-05
Payer: MEDICAID

## 2025-05-05 DIAGNOSIS — F90.0 ADHD (ATTENTION DEFICIT HYPERACTIVITY DISORDER), INATTENTIVE TYPE: Primary | ICD-10-CM
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
## 2025-05-22 ENCOUNTER — TELEMEDICINE (OUTPATIENT)
Dept: PSYCHIATRY | Facility: CLINIC | Age: 40
End: 2025-05-22
Payer: MEDICAID

## 2025-05-22 NOTE — PROGRESS NOTES
"Date: May 22, 2025  Time In: 12:02 EDT  Time out: ***      This provider is located at [facility], [facility] [place], [place], [facility], using a secure [employer] Video Visit through [employer]. Patient is being seen remotely via telehealth at their home address is located in [place]. Patient stated they are in a secure environment for this session. The patient's condition being diagnosed and treated is appropriate for telemedicine. The provider identified themself as well as their credentials. The patient, {and/or:58163} patient's legal guardian consent to be seen remotely, and when consent is given they understand that the consent allows for patient identifiable information to be sent to a third party as needed. They may refuse to be seen remotely at any time. The electronic data is encrypted and password protected, and the patient's {and/or:34477} legal guardian has been advised of the potential risks to privacy not withstanding such measures.   PT Identifiers used: Name and .    You have chosen to receive care through a telehealth visit.  Do you consent to use a video/audio connection for your medical care today? {YES NO:01549::\"Yes\"}     Robert Mosley is a 39 y.o. male who presents today for {Blank multiple:37677}    Chief Complaint: No chief complaint on file.       History of Present Illness:       Clinical Maneuvering/Intervention:    On a scale 0-10 ( with 10 being the worst)  Anxiety: {0-10:56821}/10  Depression: {0-10:77511}/10    Sleep: {RWSleep:95436}    Appetite: {RWEatin}      Assisted patient in processing above session content; acknowledged and normalized patient’s thoughts, feelings, and concerns.  Rationalized patient thought process regarding concerns presented at session.  Discussed triggers associated with patient's  {Triggers:74330} Also discussed coping skills for patient to implement such as {Coping Skills:53761}    Allowed patient to " freely discuss issues without interruption or judgment. Provided safe, confidential environment to facilitate the development of positive therapeutic relationship and encourage open, honest communication. Assisted patient in identifying risk factors which would indicate the need for higher level of care including thoughts to harm self or others and/or self-harming behavior and encouraged patient to contact this office, call 911, or present to the nearest emergency room should any of these events occur. Discussed crisis intervention services and means to access. Patient adamantly and convincingly denies current suicidal or homicidal ideation or perceptual disturbance.    Assessment:     Patient appears to maintain relative stability as compared to their baseline.  However, patient continues to struggle with No chief complaint on file.   which continues to cause impairment in important areas of functioning.  A result, they can be reasonably expected to continue to benefit from treatment and would likely be at increased risk for decompensation otherwise.      PHQ-9 Depression Screening  Little interest or pleasure in doing things?     Feeling down, depressed, or hopeless?     PHQ-2 Total Score     Trouble falling or staying asleep, or sleeping too much?     Feeling tired or having little energy?     Poor appetite or overeating?     Feeling bad about yourself - or that you are a failure or have let yourself or your family down?     Trouble concentrating on things, such as reading the newspaper or watching television?     Moving or speaking so slowly that other people could have noticed? Or the opposite - being so fidgety or restless that you have been moving around a lot more than usual?     Thoughts that you would be better off dead, or of hurting yourself in some way?     PHQ-9 Total Score     If you checked off any problems, how difficult have these problems made it for you to do your work, take care of things at  home, or get along with other people?           VICTOR MANUEL-7   Feeling nervous, anxious or on edge?     Not being able to stop or control worrying?     Worrying too much about different things?     Trouble Relaxing     Being so restless that it is hard to sit still     Becoming easily annoyed or irritable     Feeling afraid as if something awful might happen?     VICTOR MANUEL Total Score     If you checked any problesm, how difficult have these problems made it for you to do your work, take care of things at home, or get along with other people?            Mental Status Exam:   Hygiene:   {good/fair/poor:290945581}  Cooperation:  {Cooperation:657439445}  Eye Contact:  {Eye Contact:878300027}  Psychomotor Behavior:  {Psychomotor Behavior:55447}  Affect:  {Affect:849001365}  Mood: {Mood:09944}  Speech:  {Speech:014590949}  Thought Process:  {Thought Process:043123517}  Thought Content:  {Thought Content:073133547}  Suicidal:  {Suicidal:401103016}  Homicidal:  {Homidical:471372876}  Hallucinations:  {Hallucinations:807413070}  Delusion:  {Delusion:168791288}  Memory:  {Memory:866789177}  Orientation:  {Orientation:601221849}  Reliability:  {good/fair/poor:055336755}  Insight:  {good/fair/poor/none:261081628}  Judgement:  {good/fair/poor/impaired:103307753}  Impulse Control:  {good/fair/poor/impaired:754879373}  Physical/Medical Issues:  {No/Yes:075802949}       Patient's Support Network Includes:  {family members:}    Functional Status: {rsfunctionalstatus:01113}    Progress toward goal: {tnpt}    Prognosis: {tnprognosis:84713}        Plan:    Patient will continue in individual outpatient therapy with focus on improved functioning and coping skills, maintaining stability, and avoiding decompensation and the need for higher level of care.    Patient will adhere to medication regimen as prescribed and report any side effects. Patient will contact this office, call 911 or present to the nearest emergency room should suicidal  or homicidal ideations occur. Provide Cognitive Behavioral Therapy and Solution Focused Therapy to improve functioning, maintain stability, and avoid decompensation and the need for higher level of care.     No follow-ups on file.      VISIT DIAGNOSIS:   No diagnosis found.       This document has been electronically signed by Simona Simeon LCSW  May 22, 2025      Part of this note may be an electronic transcription/translation of spoken language to printed text using the Dragon Dictation System.

## 2025-06-02 ENCOUNTER — TELEMEDICINE (OUTPATIENT)
Dept: PSYCHIATRY | Facility: CLINIC | Age: 40
End: 2025-06-02
Payer: MEDICAID

## 2025-06-02 DIAGNOSIS — F90.0 ADHD (ATTENTION DEFICIT HYPERACTIVITY DISORDER), INATTENTIVE TYPE: Primary | ICD-10-CM

## 2025-06-02 PROCEDURE — 90837 PSYTX W PT 60 MINUTES: CPT | Performed by: SOCIAL WORKER

## 2025-06-02 NOTE — PROGRESS NOTES
Date: 2025  Time In: 10:03 EDT  Time out: 10:56 EDT      This provider is located at [facility], [address], [place], [facility], using a secure Bersthart Video Visit through [employer]. Patient is being seen remotely via telehealth at their home address is located in [place]. Patient stated they are in a secure environment for this session. The patient's condition being diagnosed and treated is appropriate for telemedicine. The provider identified themself as well as their credentials. The patient, or  patient's legal guardian consent to be seen remotely, and when consent is given they understand that the consent allows for patient identifiable information to be sent to a third party as needed. They may refuse to be seen remotely at any time. The electronic data is encrypted and password protected, and the patient's or  legal guardian has been advised of the potential risks to privacy not withstanding such measures.   PT Identifiers used: Name and .    You have chosen to receive care through a telehealth visit.  Do you consent to use a video/audio connection for your medical care today? Yes     Subjective   Ann Mosley is a 39 y.o. male who presents today for follow up    Chief Complaint: ADHD, interpersonal issues with spouse     History of Present Illness: Client discussed how things have been since last session.  Client shared that he is struggling to communicate with his wife, and shared his difficulties in these areas.  He states that he struggles because he feels that he is not financially providing for his family he wants and this has been a source of tension for him and his wife.  He states that his wife has also expressed that she feels that he does not show her that she is not deserving of being treated better.       Clinical Maneuvering/Intervention:    On a scale 0-10 ( with 10 being the worst)  Anxiety: 1/10  Depression: 1/10    Sleep: Client reports  that even when he gets a full night of sleep, he will wake up very tired and grumpy.  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  He states that typically he sleeps better when he is at home    Appetite: No current eating issues      Assisted patient in processing above session content; acknowledged and normalized patient’s thoughts, feelings, and concerns.  Rationalized patient thought process regarding concerns presented at session.  Discussed triggers associated with patient's  ADHD Also discussed coping skills for patient to implement such as self care  and positive self talk     Allowed patient to freely discuss issues without interruption or judgment. Provided safe, confidential environment to facilitate the development of positive therapeutic relationship and encourage open, honest communication. Assisted patient in identifying risk factors which would indicate the need for higher level of care including thoughts to harm self or others and/or self-harming behavior and encouraged patient to contact this office, call 911, or present to the nearest emergency room should any of these events occur. Discussed crisis intervention services and means to access. Patient adamantly and convincingly denies current suicidal or homicidal ideation or perceptual disturbance.    Assessment:     Patient appears to maintain relative stability as compared to their baseline.  However, patient continues to struggle with ADHD which continues to cause impairment in important areas of functioning.  A result, they can be reasonably expected to continue to benefit from treatment and would likely be at increased risk for decompensation otherwise.      PHQ-9 Depression Screening  Little interest or pleasure in doing things?  2   Feeling down, depressed, or hopeless?  1   PHQ-2 Total Score  3   Trouble falling or staying asleep, or sleeping too much?  1   Feeling tired or having little energy?  1    Poor appetite or overeating?  0   Feeling bad about yourself - or that you are a failure or have let yourself or your family down?  0   Trouble concentrating on things, such as reading the newspaper or watching television?  0   Moving or speaking so slowly that other people could have noticed? Or the opposite - being so fidgety or restless that you have been moving around a lot more than usual?  0   Thoughts that you would be better off dead, or of hurting yourself in some way?  0   PHQ-9 Total Score  5   If you checked off any problems, how difficult have these problems made it for you to do your work, take care of things at home, or get along with other people?  Somewhat difficult         VICTOR MANUEL-7   Feeling nervous, anxious or on edge?  2   Not being able to stop or control worrying?  0   Worrying too much about different things?  3   Trouble Relaxing  0   Being so restless that it is hard to sit still  0   Becoming easily annoyed or irritable  2   Feeling afraid as if something awful might happen?  0   VICTOR MANUEL Total Score  7   If you checked any problesm, how difficult have these problems made it for you to do your work, take care of things at home, or get along with other people?  Somewhat difficult          Mental Status Exam:   Hygiene:   good  Cooperation:  Cooperative  Eye Contact:  Good  Psychomotor Behavior:  Appropriate  Affect:  Appropriate  Mood: sad and anxious  Speech:  Normal  Thought Process:  Goal directed and Linear  Thought Content:  Mood congruent  Suicidal:  None  Homicidal:  None  Hallucinations:  None  Delusion:  None  Memory:  Intact  Orientation:  Person, Place, Time, and Situation  Reliability:  good  Insight:  Good  Judgement:  Good  Impulse Control:  Good  Physical/Medical Issues:  No current issues reported       Patient's Support Network Includes:  wife, children, and extended family    Functional Status: Moderate impairment     Progress toward goal: Not at goal    Prognosis: Fair with  Ongoing Treatment         Plan:    Patient will continue in individual outpatient therapy with focus on improved functioning and coping skills, maintaining stability, and avoiding decompensation and the need for higher level of care.    Patient will adhere to medication regimen as prescribed and report any side effects. Patient will contact this office, call 911 or present to the nearest emergency room should suicidal or homicidal ideations occur. Provide Cognitive Behavioral Therapy and Solution Focused Therapy to improve functioning, maintain stability, and avoid decompensation and the need for higher level of care.     Return in about 2 weeks (around 6/16/2025).      VISIT DIAGNOSIS:    Diagnosis Plan   1. ADHD (attention deficit hyperactivity disorder), inattentive type               This document has been electronically signed by Simona Simeon LCSW  June 2, 2025      Part of this note may be an electronic transcription/translation of spoken language to printed text using the Dragon Dictation System.

## 2025-06-10 ENCOUNTER — TELEPHONE (OUTPATIENT)
Dept: PSYCHIATRY | Facility: CLINIC | Age: 40
End: 2025-06-10

## 2025-06-10 NOTE — TELEPHONE ENCOUNTER
Phone call to client about session.  Client stated that he was having internet issues.  Discussed with client and he asked to be rescheduled, offered him appt for tomorrow at 12 and he accepted

## 2025-06-11 ENCOUNTER — TELEMEDICINE (OUTPATIENT)
Dept: PSYCHIATRY | Facility: CLINIC | Age: 40
End: 2025-06-11
Payer: MEDICAID

## 2025-06-11 DIAGNOSIS — F43.21 GRIEF: ICD-10-CM

## 2025-06-11 DIAGNOSIS — F43.22 ADJUSTMENT DISORDER WITH ANXIOUS MOOD: ICD-10-CM

## 2025-06-11 DIAGNOSIS — F90.0 ADHD (ATTENTION DEFICIT HYPERACTIVITY DISORDER), INATTENTIVE TYPE: Primary | ICD-10-CM

## 2025-06-11 NOTE — PROGRESS NOTES
Date: 2025  Time In: 12:01 EDT  Time out: 12:24 EDT      This provider is located at [facility], [facility] [address], [place], [facility], using a secure MyChart Video Visit through [employer]. Patient is being seen remotely via telehealth at [address] [place] [address]. Patient stated they are in a secure environment for this session. The patient's condition being diagnosed and treated is appropriate for telemedicine. The provider identified themself as well as their credentials. The patient, or  patient's legal guardian consent to be seen remotely, and when consent is given they understand that the consent allows for patient identifiable information to be sent to a third party as needed. They may refuse to be seen remotely at any time. The electronic data is encrypted and password protected, and the patient's or  legal guardian has been advised of the potential risks to privacy not withstanding such measures.   PT Identifiers used: Name and .    You have chosen to receive care through a telehealth visit.  Do you consent to use a video/audio connection for your medical care today? Yes     Subjective   Ann Mosley is a 39 y.o. male who presents today for follow up    Chief Complaint: ADHD, adjustment disorder, and grief     History of Present Illness: Client discussed how things have been since last session.  Client discussed that since last session he and his wife have been able to have a productive conversation about their relationship and his feelings.  Client reports that he really struggles to express his own emotions and dicussed in session why this is so hard for him. He also discussed the death of his father and how he is still struggling to come to terms with that loss      Clinical Maneuvering/Intervention:    On a scale 0-10 ( with 10 being the worst)  Anxiety: 3/10  Depression: 0/10    Sleep: No current changes    Appetite: No current eating  issues      Assisted patient in processing above session content; acknowledged and normalized patient’s thoughts, feelings, and concerns.  Rationalized patient thought process regarding concerns presented at session.  Discussed triggers associated with patient's  ADHD, grief, and adjustment disorder Also discussed coping skills for patient to implement such as self care , positive self talk , and greiving    Allowed patient to freely discuss issues without interruption or judgment. Provided safe, confidential environment to facilitate the development of positive therapeutic relationship and encourage open, honest communication. Assisted patient in identifying risk factors which would indicate the need for higher level of care including thoughts to harm self or others and/or self-harming behavior and encouraged patient to contact this office, call 911, or present to the nearest emergency room should any of these events occur. Discussed crisis intervention services and means to access. Patient adamantly and convincingly denies current suicidal or homicidal ideation or perceptual disturbance.    Assessment:     Patient appears to maintain relative stability as compared to their baseline.  However, patient continues to struggle with ADHD, Grief, adjustment disorder which continues to cause impairment in important areas of functioning.  A result, they can be reasonably expected to continue to benefit from treatment and would likely be at increased risk for decompensation otherwise.      PHQ-9 Depression Screening  Little interest or pleasure in doing things? (Patient-Rptd) Several days   Feeling down, depressed, or hopeless? (Patient-Rptd) Several days   PHQ-2 Total Score (Patient-Rptd) 2   Trouble falling or staying asleep, or sleeping too much? (Patient-Rptd) Several days   Feeling tired or having little energy? (Patient-Rptd) Several days   Poor appetite or overeating? (Patient-Rptd) Not at all   Feeling bad about  yourself - or that you are a failure or have let yourself or your family down? (Patient-Rptd) Not at all   Trouble concentrating on things, such as reading the newspaper or watching television? (Patient-Rptd) Not at all   Moving or speaking so slowly that other people could have noticed? Or the opposite - being so fidgety or restless that you have been moving around a lot more than usual? (Patient-Rptd) Not at all   Thoughts that you would be better off dead, or of hurting yourself in some way? (Patient-Rptd) Not at all   PHQ-9 Total Score (Patient-Rptd) 4   If you checked off any problems, how difficult have these problems made it for you to do your work, take care of things at home, or get along with other people? (Patient-Rptd) Not difficult at all         VICTOR MANUEL-7   Feeling nervous, anxious or on edge? Feeling nervous, anxious or on edge: (Patient-Rptd) 1   Not being able to stop or control worrying? Not being able to stop or control worrying: (Patient-Rptd) 1   Worrying too much about different things? Worrying too much about different things: (Patient-Rptd) 0   Trouble Relaxing Trouble Relaxing: (Patient-Rptd) 1   Being so restless that it is hard to sit still Being so restless that it is hard to sit still: (Patient-Rptd) 0   Becoming easily annoyed or irritable Becoming easily annoyed or irritable: (Patient-Rptd) 1   Feeling afraid as if something awful might happen? Feeling afraid as if something awful might happen: (Patient-Rptd) 0   VICTOR MANUEL Total Score VICTOR MANUEL 7 Total Score: (Patient-Rptd) 4   If you checked any problesm, how difficult have these problems made it for you to do your work, take care of things at home, or get along with other people?            Mental Status Exam:   Hygiene:   good  Cooperation:  Cooperative  Eye Contact:  Good  Psychomotor Behavior:  Appropriate  Affect:  Blunted  Mood: sad and anxious  Speech:  Normal  Thought Process:  Goal directed and Linear  Thought Content:  Mood  congruent  Suicidal:  None  Homicidal:  None  Hallucinations:  None  Delusion:  None  Memory:  Intact  Orientation:  Person, Place, Time, and Situation  Reliability:  good  Insight:  Good  Judgement:  Good  Impulse Control:  Good  Physical/Medical Issues:  No current issues reported at this time       Patient's Support Network Includes:  wife and son    Functional Status: Moderate impairment     Progress toward goal: Not at goal    Prognosis: Fair with Ongoing Treatment         Plan:    Patient will continue in individual outpatient therapy with focus on improved functioning and coping skills, maintaining stability, and avoiding decompensation and the need for higher level of care.    Patient will adhere to medication regimen as prescribed and report any side effects. Patient will contact this office, call 911 or present to the nearest emergency room should suicidal or homicidal ideations occur. Provide Cognitive Behavioral Therapy and Solution Focused Therapy to improve functioning, maintain stability, and avoid decompensation and the need for higher level of care.     No follow-ups on file.      VISIT DIAGNOSIS:    Diagnosis Plan   1. ADHD (attention deficit hyperactivity disorder), inattentive type        2. Adjustment disorder with anxious mood        3. Grief               This document has been electronically signed by Simona Simeon LCSW  June 11, 2025      Part of this note may be an electronic transcription/translation of spoken language to printed text using the Dragon Dictation System.

## 2025-07-28 ENCOUNTER — TELEMEDICINE (OUTPATIENT)
Dept: PSYCHIATRY | Facility: CLINIC | Age: 40
End: 2025-07-28
Payer: MEDICAID

## 2025-07-28 DIAGNOSIS — F90.0 ADHD (ATTENTION DEFICIT HYPERACTIVITY DISORDER), INATTENTIVE TYPE: Primary | ICD-10-CM

## 2025-07-28 DIAGNOSIS — F43.22 ADJUSTMENT DISORDER WITH ANXIOUS MOOD: ICD-10-CM

## 2025-07-28 NOTE — PROGRESS NOTES
Date: 2025  Time In: 11:02 EDT  Time out: 12:00 EDT      This provider is located at [facility], [facility] [place], [place], [facility], using a secure [employer] Video Visit through [employer]. Patient is being seen remotely via telehealth at their home address is located in [place]. Patient stated they are in a secure environment for this session. The patient's condition being diagnosed and treated is appropriate for telemedicine. The provider identified themself as well as their credentials. The patient, or  patient's legal guardian consent to be seen remotely, and when consent is given they understand that the consent allows for patient identifiable information to be sent to a third party as needed. They may refuse to be seen remotely at any time. The electronic data is encrypted and password protected, and the patient's or  legal guardian has been advised of the potential risks to privacy not withstanding such measures.   PT Identifiers used: Name and .    You have chosen to receive care through a telehealth visit.  Do you consent to use a video/audio connection for your medical care today? Yes     Subjective   Ann Mosley is a 39 y.o. male who presents today for follow up    Chief Complaint: Interpersonal relationships , ADHD    History of Present Illness: Client discussed how things have been since last session.  He reports that he and his family all had Covid.  Client reports that things are improving between he and his wife.  He shared that he has been working on improving his communication with his wife. Client reports that he feels that he struggles with expressing his emotions.  He states that he has problems understanding emotions, he does have empathy for others.  He can not read emotions. Client and therapist discussed how dismissive attachment develops.  He reports that in [place] from it is not acceptable to discuss and talk about feelings.  Client discussed that he has a tendency when he is confronted about something, or when he is wrong or does not have a valid reason for his behavior, he has a tendency to shut down. He reports that this includes situations where he might be ashamed or does not know what to say, or in situations where he knows that he is in the wrong. Client discussed that it is very difficult for him to read other people's emotions.  Client and therapist discussed the difference between empathy and sympathy.      Clinical Maneuvering/Intervention:    On a scale 0-10 ( with 10 being the worst)  Anxiety: 1/10  Depression: 2/10    Sleep: No current changes    Appetite: No current changes      Assisted patient in processing above session content; acknowledged and normalized patient’s thoughts, feelings, and concerns.  Rationalized patient thought process regarding concerns presented at session.  Discussed triggers associated with patient's  ADHD and interpersonal relationships Also discussed coping skills for patient to implement such as self care , positive self talk , and communication    Allowed patient to freely discuss issues without interruption or judgment. Provided safe, confidential environment to facilitate the development of positive therapeutic relationship and encourage open, honest communication. Assisted patient in identifying risk factors which would indicate the need for higher level of care including thoughts to harm self or others and/or self-harming behavior and encouraged patient to contact this office, call 911, or present to the nearest emergency room should any of these events occur. Discussed crisis intervention services and means to access. Patient adamantly and convincingly denies current suicidal or homicidal ideation or perceptual disturbance.    Assessment:     Patient appears to maintain relative stability as compared to their baseline.  However, patient continues to struggle with interpersonal  relationships, and ADHD which continues to cause impairment in important areas of functioning.  A result, they can be reasonably expected to continue to benefit from treatment and would likely be at increased risk for decompensation otherwise.           Mental Status Exam:   Hygiene:   good  Cooperation:  Cooperative  Eye Contact:  Good  Psychomotor Behavior:  Appropriate  Affect:  Appropriate  Mood: normal  Speech:  Normal  Thought Process:  Goal directed and Linear  Thought Content:  Mood congruent  Suicidal:  None  Homicidal:  None  Hallucinations:  None  Delusion:  None  Memory:  Intact  Orientation:  Person, Place, Time, and Situation  Reliability:  good  Insight:  Good  Judgement:  Good  Impulse Control:  Good  Physical/Medical Issues:  No current  changes       Patient's Support Network Includes:  wife, son, and extended family    Functional Status: Moderate impairment     Progress toward goal: Not at goal    Prognosis: Fair with Ongoing Treatment         Plan:    Patient will continue in individual outpatient therapy with focus on improved functioning and coping skills, maintaining stability, and avoiding decompensation and the need for higher level of care.    Patient will adhere to medication regimen as prescribed and report any side effects. Patient will contact this office, call 911 or present to the nearest emergency room should suicidal or homicidal ideations occur. Provide Cognitive Behavioral Therapy and Solution Focused Therapy to improve functioning, maintain stability, and avoid decompensation and the need for higher level of care.     Return in about 2 weeks (around 8/11/2025).      VISIT DIAGNOSIS:    Diagnosis Plan   1. ADHD (attention deficit hyperactivity disorder), inattentive type        2. Adjustment disorder with anxious mood               This document has been electronically signed by Simona Simeon LCSW  July 28, 2025      Part of this note may be an electronic  transcription/translation of spoken language to printed text using the Dragon Dictation System.

## 2025-08-07 ENCOUNTER — TELEPHONE (OUTPATIENT)
Dept: PSYCHIATRY | Facility: CLINIC | Age: 40
End: 2025-08-07

## 2025-08-27 ENCOUNTER — TELEMEDICINE (OUTPATIENT)
Dept: PSYCHIATRY | Facility: CLINIC | Age: 40
End: 2025-08-27
Payer: MEDICAID

## 2025-08-27 DIAGNOSIS — F90.0 ADHD (ATTENTION DEFICIT HYPERACTIVITY DISORDER), INATTENTIVE TYPE: Primary | ICD-10-CM

## 2025-08-27 DIAGNOSIS — F43.22 ADJUSTMENT DISORDER WITH ANXIOUS MOOD: ICD-10-CM
